# Patient Record
Sex: MALE | Race: WHITE | NOT HISPANIC OR LATINO | Employment: FULL TIME | URBAN - METROPOLITAN AREA
[De-identification: names, ages, dates, MRNs, and addresses within clinical notes are randomized per-mention and may not be internally consistent; named-entity substitution may affect disease eponyms.]

---

## 2021-03-24 ENCOUNTER — IMMUNIZATION (OUTPATIENT)
Dept: VACCINE CLINIC | Facility: HOSPITAL | Age: 41
End: 2021-03-24

## 2021-03-24 PROCEDURE — 91300 HC SARSCOV02 VAC 30MCG/0.3ML IM: CPT | Performed by: INTERNAL MEDICINE

## 2021-03-24 PROCEDURE — 0001A: CPT | Performed by: INTERNAL MEDICINE

## 2021-04-14 ENCOUNTER — IMMUNIZATION (OUTPATIENT)
Dept: VACCINE CLINIC | Facility: HOSPITAL | Age: 41
End: 2021-04-14

## 2021-04-14 PROCEDURE — 0002A: CPT | Performed by: INTERNAL MEDICINE

## 2021-04-14 PROCEDURE — 91300 HC SARSCOV02 VAC 30MCG/0.3ML IM: CPT | Performed by: INTERNAL MEDICINE

## 2022-09-04 ENCOUNTER — HOSPITAL ENCOUNTER (EMERGENCY)
Facility: HOSPITAL | Age: 42
Discharge: HOME | End: 2022-09-04
Attending: EMERGENCY MEDICINE | Admitting: EMERGENCY MEDICINE

## 2022-09-04 VITALS
HEIGHT: 77 IN | SYSTOLIC BLOOD PRESSURE: 143 MMHG | WEIGHT: 315 LBS | DIASTOLIC BLOOD PRESSURE: 102 MMHG | TEMPERATURE: 98.1 F | HEART RATE: 109 BPM | OXYGEN SATURATION: 96 % | BODY MASS INDEX: 37.19 KG/M2 | RESPIRATION RATE: 16 BRPM

## 2022-09-04 DIAGNOSIS — S81.811A LACERATION OF RIGHT LOWER EXTREMITY, INITIAL ENCOUNTER: Primary | ICD-10-CM

## 2022-09-04 PROCEDURE — 99282 EMERGENCY DEPT VISIT SF MDM: CPT

## 2022-09-04 RX ORDER — LIDOCAINE HYDROCHLORIDE AND EPINEPHRINE 10; 10 MG/ML; UG/ML
10 INJECTION, SOLUTION INFILTRATION; PERINEURAL ONCE
Status: COMPLETED | OUTPATIENT
Start: 2022-09-04 | End: 2022-09-04

## 2022-09-04 RX ADMIN — LIDOCAINE HYDROCHLORIDE,EPINEPHRINE BITARTRATE 10 ML: 10; .01 INJECTION, SOLUTION INFILTRATION; PERINEURAL at 16:43

## 2022-09-04 NOTE — ED PROVIDER NOTES
MD ATTESTATION NOTE    The JOIE and I have discussed this patient's history, physical exam, and treatment plan.  I have reviewed the documentation and personally had a face to face interaction with the patient. I affirm the documentation and agree with the treatment and plan.  The attached note describes my personal findings.    I provided a substantive portion of the care of this patient. I personally performed the physical exam, in its entirety.    History  42-year-old male presents for repair of laceration to the right lower extremity after being cut on a metal cooler prior to arrival.    Physical Exam  Vital Signs reviewed  GENERAL: Alert male no obvious distress.  Triage vitals reviewed and notable for mildly elevated blood pressure of 143/102  HENT: nares patent  EYES: no scleral icterus  CV: regular rhythm, regular rate  RESPIRATORY: normal effort, clear to auscultation bilaterally  ABDOMEN: soft  MUSCULOSKELETAL: no deformity  NEURO: Strength sensation and coordination are grossly intact.  Speech and mentation are unremarkable  SKIN: Moderately deep laceration of the right lateral calf.      Disposition  Wound repair per MOHINDER Virk.       Rome Alcazar MD  09/04/22 [id number]

## 2022-09-04 NOTE — ED TRIAGE NOTES
Pt arrived PV with complaints of a laceration to his left calf. Pt reports he hit it on a cooler causing the laceration. Bleeding is controlled by a bandaged placed at [facility].     Pt was wearing a mask during assessment.  This RN wore appropriate PPE

## 2022-09-04 NOTE — ED PROVIDER NOTES
[facility] DEPARTMENT ENCOUNTER    Room Number:  08/08  Date seen:  9/4/2022  Time seen: 16:35 EDT  PCP: Dalton Ambriz MD  Historian: patient      HPI:  Chief complaint: laceration  Context: Gabe Gomez is a 42 y.o. male who presents to the ED from [facility] c/o a right lower leg laceration that occurred prior to arrival after cutting it on the metal part of a cooler.  He does not take blood thinners.  He denies any weakness or numbness.  He states Tdap is up-to-date.          MEDICAL RECORD REVIEW     Reviewed in [facility]      ALLERGIES  Patient has no known allergies.    PAST MEDICAL HISTORY  Active Ambulatory Problems     Diagnosis Date Noted   • No Active Ambulatory Problems     Resolved Ambulatory Problems     Diagnosis Date Noted   • No Resolved Ambulatory Problems     No Additional Past Medical History       PAST SURGICAL HISTORY  History reviewed. No pertinent surgical history.    FAMILY HISTORY  Family History   Problem Relation Age of Onset   • No Known Problems Mother    • No Known Problems Father    • No Known Problems Sister    • No Known Problems Brother    • No Known Problems Son    • No Known Problems Daughter    • No Known Problems Maternal Grandmother    • No Known Problems Maternal Grandfather        SOCIAL HISTORY  Social History     Socioeconomic History   • Marital status:    Tobacco Use   • Smoking status: Never Smoker   • Smokeless tobacco: Never Used   Vaping Use   • Vaping Use: Never used   Substance and Sexual Activity   • Alcohol use: Yes   • Drug use: Never   • Sexual activity: Defer           REVIEW OF SYSTEMS  Review of Systems   Constitutional: Negative for chills and fever.   Respiratory: Negative for shortness of breath.    Musculoskeletal: Negative for arthralgias and joint swelling.   Skin: Positive for wound (lac R lower leg).   Neurological: Negative for dizziness, weakness and numbness.   All other systems reviewed and are negative.          PHYSICAL EXAM  ED Triage  Vitals   Temp Heart Rate Resp BP SpO2   09/04/22 1624 09/04/22 1624 09/04/22 1624 09/04/22 1627 09/04/22 1624   98.1 °F (36.7 °C) 109 16 (!) 143/102 96 %      Temp src Heart Rate Source Patient Position BP Location FiO2 (%)   09/04/22 1624 09/04/22 1624 -- -- --   Tympanic Monitor        Physical Exam  Vitals and nursing note reviewed.   Constitutional:       Appearance: Normal appearance.   HENT:      Head: Normocephalic and atraumatic.      Mouth/Throat:      Mouth: Mucous membranes are moist.   Eyes:      Conjunctiva/sclera: Conjunctivae normal.   Cardiovascular:      Rate and Rhythm: Regular rhythm. Tachycardia present.      Pulses: Normal pulses.   Pulmonary:      Effort: Pulmonary effort is normal.   Musculoskeletal:         General: No swelling. Normal range of motion.      Cervical back: Normal range of motion.   Skin:     General: Skin is warm and dry.      Capillary Refill: Capillary refill takes less than 2 seconds.      Findings: Laceration present.          Neurological:      Mental Status: He is alert.             PROCEDURES  Laceration Repair    Date/Time: 9/4/2022 5:23 PM  Performed by: Janett Virk PA  Authorized by: Rome Alcazar MD     Consent:     Consent obtained:  Verbal    Consent given by:  Patient    Risks, benefits, and alternatives were discussed: yes      Risks discussed:  Infection and pain  Universal protocol:     Procedure explained and questions answered to patient or proxy's satisfaction: yes      Patient identity confirmed:  Verbally with patient  Laceration details:     Length (cm):  7  Pre-procedure details:     Preparation:  Patient was prepped and draped in usual sterile fashion  Exploration:     Wound exploration: wound explored through full range of motion      Contaminated: no    Treatment:     Area cleansed with:  Chlorhexidine and saline    Amount of cleaning:  Standard    Debridement:  None    Layers/structures repaired:  Deep dermal/superficial fascia  Deep  "dermal/superficial fascia:     Suture size:  3-0    Suture material:  Vicryl    Suture technique:  Simple interrupted    Number of sutures:  2  Skin repair:     Repair method:  Sutures    Suture size:  4-0    Suture material:  Nylon    Suture technique:  Simple interrupted    Number of sutures:  8  Approximation:     Approximation:  Close  Repair type:     Repair type:  Intermediate  Post-procedure details:     Dressing:  Antibiotic ointment and non-adherent dressing    Procedure completion:  Tolerated well, no immediate complications            COURSE & MEDICAL DECISION MAKING  Pertinent Labs and Imaging studies that were ordered and reviewed are noted above.  Results were reviewed/discussed with the patient and they were also made aware of online access.  Pt also made aware that some labs, such as cultures, will not be resulted during ER visit and follow up with PMD is necessary.     Patient presents with laceration to right lower leg.  Tdap up-to-date.  Neurovascularly intact.  Suture repaired without difficulty please see above procedure note.  Wound care instructions discussed.  Follow-up and return precautions given.    PROGRESS AND CONSULTS    Progress Notes:          Reviewed pt's history and workup with Dr. Alcazar (ER physician).  After a bedside evaluation, Dr. Alcazar agrees with the plan of care.            Disposition vitals:  BP (!) 143/102   Pulse 109   Temp 98.1 °F (36.7 °C) (Tympanic)   Resp 16   Ht 195.6 cm (77\")   Wt (!) 154 kg (340 lb)   SpO2 96%   BMI 40.32 kg/m²         DIAGNOSIS  Final diagnoses:   Laceration of right lower extremity, initial encounter         DISPOSITION  ED Disposition     ED Disposition   Discharge    Condition   Stable    Comment   --                 FOLLOW UP   Dalton Ambriz MD  [address] [address]  [phone]      For suture removal in 10-14 days    [facility] Emergency Department  [address] [facility] " [place] [id number] [phone]    As needed, For suture removal in 10-14 days          RX     Medication List      No changes were made to your prescriptions during this visit.                             Janett Virk PA  09/04/22 1923